# Patient Record
Sex: MALE | Race: WHITE
[De-identification: names, ages, dates, MRNs, and addresses within clinical notes are randomized per-mention and may not be internally consistent; named-entity substitution may affect disease eponyms.]

---

## 2018-04-20 ENCOUNTER — HOSPITAL ENCOUNTER (OUTPATIENT)
Dept: HOSPITAL 72 - CAT | Age: 68
Discharge: HOME | End: 2018-04-20
Payer: MEDICARE

## 2018-04-20 DIAGNOSIS — R91.8: Primary | ICD-10-CM

## 2018-04-20 PROCEDURE — 71260 CT THORAX DX C+: CPT

## 2018-04-20 NOTE — DIAGNOSTIC IMAGING REPORT
Indication: Questionable mass on chest radiograph.

 

Technique: CT of the chest performed utilizing automated exposure control with

intravenous contrast. Axial, sagittal and coronal images.

 

CT dose: Total DLP 1034 mGycm; CTDI vol 20.73 mGy

 

Comparison: None

 

Findings: 

Multiple scattered calcified granulomas are noted bilaterally. There is a 2.5 cm

pneumatocele in the left lower lobe which may be sequela of prior infection or trauma

(image 39). There is dependent atelectatic changes bilaterally. Linear scarring in

the lingula and anterior right upper lobe. There is no pleural effusion or

pneumothorax.

 

Heart size within normal limits. There is no pericardial effusion. Thoracic aorta is

normal in caliber. There is conventional branching anatomy of the great vessels.

There is no significant atherosclerotic calcification. There are calcified

mediastinal and hilar lymph nodes. No pathologically enlarged mediastinal

lymphadenopathy is seen. Thyroid is unremarkable in appearance. 

 

Multiple calcifications are noted within the spleen. Liver, gallbladder, adrenal

glands and visualized portions of the kidneys are unremarkable. Pancreas demonstrates

some mild age-related fatty atrophy but is otherwise unremarkable. There are

multilevel degenerative changes of the spine. No acute osseous abnormality is seen.

 

IMPRESSION: 

Multiple scattered calcified granulomas. Calcified hilar and mediastinal lymph nodes

and multiple punctate calcifications in the spleen. Findings may be related to prior

granulomatous exposure. Correlate clinically.

 

2.5 cm pneumatocele in the left lower lobe possibly sequela of prior trauma or

infection.

 

The CT scanner at [facility] is accredited by the [employer]

[employer] and the scans are performed using protocols designed to limit radiation

exposure to as low as reasonably achievable to attain images of sufficient resolution

adequate for diagnostic evaluation.